# Patient Record
Sex: MALE | URBAN - METROPOLITAN AREA
[De-identification: names, ages, dates, MRNs, and addresses within clinical notes are randomized per-mention and may not be internally consistent; named-entity substitution may affect disease eponyms.]

---

## 2021-11-29 ENCOUNTER — TRANSFERRED RECORDS (OUTPATIENT)
Dept: HEALTH INFORMATION MANAGEMENT | Facility: CLINIC | Age: 66
End: 2021-11-29

## 2021-11-29 ENCOUNTER — OFFICE VISIT (OUTPATIENT)
Dept: FAMILY MEDICINE | Facility: CLINIC | Age: 66
End: 2021-11-29
Payer: COMMERCIAL

## 2021-11-29 VITALS
DIASTOLIC BLOOD PRESSURE: 78 MMHG | OXYGEN SATURATION: 96 % | HEART RATE: 83 BPM | TEMPERATURE: 97.3 F | SYSTOLIC BLOOD PRESSURE: 135 MMHG

## 2021-11-29 DIAGNOSIS — S09.90XA CLOSED HEAD INJURY, INITIAL ENCOUNTER: Primary | ICD-10-CM

## 2021-11-29 PROCEDURE — 99204 OFFICE O/P NEW MOD 45 MIN: CPT | Performed by: FAMILY MEDICINE

## 2021-11-29 RX ORDER — SITAGLIPTIN 25 MG/1
25 TABLET, FILM COATED ORAL DAILY
COMMUNITY
Start: 2021-11-26

## 2021-11-29 RX ORDER — ATORVASTATIN CALCIUM 20 MG/1
1 TABLET, FILM COATED ORAL DAILY
COMMUNITY
Start: 2020-12-23 | End: 2021-12-23

## 2021-11-29 RX ORDER — HYDROCHLOROTHIAZIDE 25 MG/1
25 TABLET ORAL
COMMUNITY

## 2021-11-29 RX ORDER — EXTENDED PHENYTOIN SODIUM 30 MG/1
CAPSULE ORAL
COMMUNITY
Start: 2020-12-23 | End: 2021-11-29

## 2021-11-29 RX ORDER — BLOOD SUGAR DIAGNOSTIC
STRIP MISCELLANEOUS
COMMUNITY
Start: 2021-06-02

## 2021-11-29 RX ORDER — LANCETS
EACH MISCELLANEOUS
COMMUNITY
Start: 2021-08-31

## 2021-11-29 RX ORDER — BLOOD-GLUCOSE METER
EACH MISCELLANEOUS SEE ADMIN INSTRUCTIONS
COMMUNITY
Start: 2021-11-02

## 2021-11-29 RX ORDER — PREDNISONE 20 MG/1
TABLET ORAL
COMMUNITY
Start: 2021-11-23

## 2021-11-29 RX ORDER — GLIPIZIDE 2.5 MG/1
1 TABLET, EXTENDED RELEASE ORAL DAILY
COMMUNITY
Start: 2020-12-28

## 2021-11-29 RX ORDER — AMLODIPINE BESYLATE 5 MG/1
5 TABLET ORAL
COMMUNITY
Start: 2020-12-23

## 2021-11-29 RX ORDER — CYCLOBENZAPRINE HCL 5 MG
TABLET ORAL
COMMUNITY
Start: 2021-05-07 | End: 2021-11-29

## 2021-11-29 RX ORDER — METHYLPREDNISOLONE 4 MG
TABLET, DOSE PACK ORAL
COMMUNITY
Start: 2021-05-21 | End: 2021-11-29

## 2021-11-29 NOTE — LETTER
REPORT OF WORK ABILITY    [address]  [address]  [phone]    PATIENT DATA    Employee Name: Emmy Ramírez      : 1955     #: [id number]    Work related injury: Yes  Employer at time of injury: [employer]  Employer contact & phone:    Employed elsewhere? No  Workers' Compensation Carrier/Managed Care Plan:       Today's date: 2021  Date of injury: 2021   Date of first visit: 2021     PROVIDER EVALUATION: Please fill in as needed.  Please give copy to employee for employer.    1. Diagnosis: closed head injury    2. Treatment: none.  3. Medication: tylenol  NOTE: When ordering a medication, [place] Rules require Work Comp or WC on prescriptions.    4.  Return to work date: 2021      ** WITH RESTRICTIONS? No restrictions    RESTRICTIONS: Unlimited unless listed.  Restrictions apply to home and leisure also.  If work restrictions is not available, the employee is totally disabled.    Maximum Medical Improvement (Date):    Any Permanent Partial Disability? 0%    Provider comments:     Medical Examiner: Guillermo Neri MD           License or registration: [id number]    Next appointment: As needed    CC: Employer, Managed Care Plan/Payor, Patient
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
## 2022-07-26 ENCOUNTER — HOSPITAL ENCOUNTER (OUTPATIENT)
Dept: GENERAL RADIOLOGY | Facility: HOSPITAL | Age: 67
Discharge: HOME | End: 2022-07-26
Attending: PHYSICIAN ASSISTANT | Admitting: PHYSICIAN ASSISTANT
Payer: COMMERCIAL

## 2022-07-26 ENCOUNTER — OFFICE VISIT (OUTPATIENT)
Dept: FAMILY MEDICINE | Facility: CLINIC | Age: 67
End: 2022-07-26
Payer: OTHER MISCELLANEOUS

## 2022-07-26 VITALS
HEART RATE: 73 BPM | OXYGEN SATURATION: 99 % | WEIGHT: 163 LBS | BODY MASS INDEX: 27.98 KG/M2 | SYSTOLIC BLOOD PRESSURE: 114 MMHG | TEMPERATURE: 97.1 F | DIASTOLIC BLOOD PRESSURE: 73 MMHG | RESPIRATION RATE: 18 BRPM
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
## 2022-07-26 PROCEDURE — 72220 X-RAY EXAM SACRUM TAILBONE: CPT | Mod: FY

## 2022-07-26 PROCEDURE — 99214 OFFICE O/P EST MOD 30 MIN: CPT | Performed by: PHYSICIAN ASSISTANT

## 2022-07-26 RX ORDER — SILDENAFIL CITRATE 20 MG/1
TABLET ORAL
COMMUNITY
Start: 2022-01-12

## 2022-07-26 ASSESSMENT — ENCOUNTER SYMPTOMS: COLOR CHANGE: 0

## 2022-07-26 NOTE — PATIENT INSTRUCTIONS
I will call you with you xray results.   Take Tylenol 1000 mg (depending on the pill strength) every 6-8 hours or ibuprofen 600mg every 6 hours by mouth.  Do not exceed 3000 mg of acetaminophen or 2400mg of ibuprofen from any source in a 24 hour period.  Taking Tylenol and ibuprofen together may be helpful in reducing pain.  Ice the head and shoulder to help with pain relief.   Seek emergency medical attention if you develop vision changes, vomiting, or severe head pain.

## 2022-07-26 NOTE — PROGRESS NOTES
Patient presents with:  Work Comp: Work Comp Injury 7/25/22 - Slipped on grease hit back of head, rt shoulder and tailbone       Clinical Decision Making: Patient slipped on grease and hit the back of his head yesterday.  No neurologic deficits and no severe head pain.  There was no loss of consciousness with head injury.  No neck stiffness or pain.  Patient also experiencing right shoulder discomfort, but has full range of motion, no swelling, no deformity.  Patient also experiencing tailbone pain.  X-ray is negative for any signs of fracture.  Patient has no signs or symptoms of cauda equina syndrome.  Recommend monitoring symptoms, pain relievers, icing, and rest.      ICD-10-CM    1. Fall, initial encounter  W19.XXXA XR Sacrum and Coccyx 2 Views       Patient Instructions   1. I will call you with you xray results.   2. Take Tylenol 1000 mg (depending on the pill strength) every 6-8 hours or ibuprofen 600mg every 6 hours by mouth.  Do not exceed 3000 mg of acetaminophen or 2400mg of ibuprofen from any source in a 24 hour period.  Taking Tylenol and ibuprofen together may be helpful in reducing pain.  3. Ice the head and shoulder to help with pain relief.   4. Seek emergency medical attention if you develop vision changes, vomiting, or severe head pain.     HPI:  Emmy Ramírez is a 66 year old male who presents today complaining of injury that occurred while at work yesterday. Patient slipped on grease and his the back of his head, right shoulder, and tailbone. No LOC. Patient is not on any blood thinners.     History obtained from the patient.    Problem List:  There are no relevant problems documented for this patient.      No past medical history on file.    Social History     Tobacco Use     Smoking status: Never Smoker     Smokeless tobacco: Never Used   Substance Use Topics     Alcohol use: Not on file       Review of Systems   Skin: Negative for color change.       Vitals:    07/26/22 1222   BP:  114/73   Pulse: 73   Resp: 18   Temp: 97.1  F (36.2  C)   TempSrc: Tympanic   SpO2: 99%   Weight: 73.9 kg (163 lb)       Physical Exam  Vitals and nursing note reviewed.   Constitutional:       General: He is not in acute distress.     Appearance: He is not toxic-appearing or diaphoretic.   HENT:      Head: Normocephalic and atraumatic.      Right Ear: Tympanic membrane, ear canal and external ear normal.      Left Ear: Tympanic membrane, ear canal and external ear normal.   Eyes:      Extraocular Movements: Extraocular movements intact.      Conjunctiva/sclera: Conjunctivae normal.      Pupils: Pupils are equal, round, and reactive to light.   Cardiovascular:      Rate and Rhythm: Normal rate and regular rhythm.      Heart sounds: No murmur heard.  Pulmonary:      Effort: Pulmonary effort is normal. No respiratory distress.      Breath sounds: No stridor. No wheezing, rhonchi or rales.   Neurological:      Mental Status: He is alert.   Psychiatric:         Mood and Affect: Mood normal.         Behavior: Behavior normal.         Thought Content: Thought content normal.         Judgment: Judgment normal.         Results:  Results for orders placed or performed during the hospital encounter of 07/26/22   XR Sacrum and Coccyx 2 Views     Status: None    Narrative    EXAM: XR SACRUM AND COCCYX 2 VIEWS  LOCATION: [facility]  DATE/TIME: 7/26/2022 1:47 PM    INDICATION: Fell yesterday. Pain on tailbone  COMPARISON: None.      Impression    IMPRESSION: No fracture. Moderate degenerative disc disease changes at L4-L5 and L5-S1.         At the end of the encounter, I discussed results, diagnosis, medications. Discussed red flags for immediate return to clinic/ER, as well as indications for follow up if no improvement. Patient understood and agreed to plan. Patient was stable for discharge.

## 2022-07-26 NOTE — LETTER
July 26, 2022      Emmy Ramírez  [address]  [place] [address]        To Whom It May Concern:    Emmy Ramírez  was seen on 7/26/22.  Please excuse him from work. He may return on 7/28/22.       Sincerely,        Vero Neri PA-C

## 2022-12-02 ENCOUNTER — OFFICE VISIT (OUTPATIENT)
Dept: FAMILY MEDICINE | Facility: CLINIC | Age: 67
End: 2022-12-02
Payer: COMMERCIAL

## 2022-12-02 VITALS
TEMPERATURE: 98.7 F | HEIGHT: 64 IN | DIASTOLIC BLOOD PRESSURE: 105 MMHG | WEIGHT: 163 LBS | OXYGEN SATURATION: 95 % | HEART RATE: 75 BPM | BODY MASS INDEX: 27.83 KG/M2 | RESPIRATION RATE: 16 BRPM | SYSTOLIC BLOOD PRESSURE: 187 MMHG

## 2022-12-02 DIAGNOSIS — K11.20 SIALADENITIS: Primary | ICD-10-CM

## 2022-12-02 PROCEDURE — 99213 OFFICE O/P EST LOW 20 MIN: CPT | Performed by: PHYSICIAN ASSISTANT

## 2022-12-02 RX ORDER — CEPHALEXIN 500 MG/1
500 CAPSULE ORAL 4 TIMES DAILY
Qty: 40 CAPSULE | Refills: 0 | Status: SHIPPED | OUTPATIENT
Start: 2022-12-02 | End: 2022-12-02

## 2022-12-02 RX ORDER — HYDROCODONE BITARTRATE AND ACETAMINOPHEN 5; 325 MG/1; MG/1
1 TABLET ORAL EVERY 6 HOURS PRN
Qty: 18 TABLET | Refills: 0 | Status: SHIPPED | OUTPATIENT
Start: 2022-12-02 | End: 2022-12-05

## 2022-12-02 RX ORDER — CEPHALEXIN 500 MG/1
500 CAPSULE ORAL 4 TIMES DAILY
Qty: 40 CAPSULE | Refills: 0 | Status: SHIPPED | OUTPATIENT
Start: 2022-12-02 | End: 2022-12-12

## 2022-12-02 NOTE — PROGRESS NOTES
Assessment & Plan     Sialadenitis  R sublingual and submandibular gland. Discussed possible etiologies including viral, obstructive, bacterial.  He has very poor dentition but no obvious dental abcess.  Consider obstructive and bacterial he has no other uri sx or systemic sx but sigificant pain.    Recommended hard candy/saliagogs, warm compresses, massage and abx as ordered.  norco for pain, he can not take NSAIDs.  PDMP review and no concerns.  If not improving as expected should recheck, may need ENT evaluation if not improving as expected.  - HYDROcodone-acetaminophen (NORCO) 5-325 MG tablet  Dispense: 18 tablet; Refill: 0  - cephALEXin (KEFLEX) 500 MG capsule  Dispense: 40 capsule; Refill: 0             No follow-ups on file.    Cookie Toney PA-C  [facility] BOLAROBIN Arellano is a 67 year old male who presents to clinic today for the following health issues:  Chief Complaint   Patient presents with     Pharyngitis     Sore thoart, pt have swelling, pt has been with pain for 4 days, pt shows me the pain around his teeth on the right side     HPI    4 day hx of pain and swelling under the R tongue as well as the R jawline.  No fevers.    Has not had similar in the past. UTD on MMR. No known exposures.  No nausea, vomiting.    Pain worse with eating.  Has tried tylenol without improving.  Quite bothered by pain.      Review of Systems  Constitutional, HEENT, cardiovascular, pulmonary, gi and gu systems are negative, except as otherwise noted.      There is no problem list on file for this patient.    Current Outpatient Medications   Medication     amLODIPine (NORVASC) 5 MG tablet     blood glucose (ACCU-CHEK GUIDE) test strip     blood glucose monitoring (SOFTCLIX) lancets     Blood Glucose Monitoring Suppl (ACCU-CHEK GUIDE ME) w/Device KIT     cephALEXin (KEFLEX) 500 MG capsule     cholecalciferol 25 MCG (1000 UT) TABS     glipiZIDE (GLUCOTROL XL) 2.5 MG 24 hr tablet      "hydrochlorothiazide (HYDRODIURIL) 25 MG tablet     HYDROcodone-acetaminophen (NORCO) 5-325 MG tablet     JANUVIA 25 MG tablet     metFORMIN (GLUCOPHAGE) 1000 MG tablet     MULTIPLE VITAMIN PO     sildenafil (REVATIO) 20 MG tablet     Zinc Methionate 50 MG CAPS     atorvastatin (LIPITOR) 20 MG tablet     predniSONE (DELTASONE) 20 MG tablet     No current facility-administered medications for this visit.       Objective    BP (!) 187/105 (BP Location: Right arm, Patient Position: Sitting, Cuff Size: Adult Regular)   Pulse 75   Temp 98.7  F (37.1  C) (Oral)   Resp 16   Ht 1.626 m (5' 4\")   Wt 73.9 kg (163 lb)   SpO2 95%   BMI 27.98 kg/m    Physical Exam   nad appears well, nontoxic appearing.   Lungs CTA  Heart RRR  Oral pharanx: no tonsillar hypertrophy or exudate.   Dentition sigificant dental decay diffusely. No gingival mucosa swelling , ulcerations or errosions.    No TTP of any of the teeth.    The sublingual gland  on the R is erythematous and edematous, TTP and palpable nodules on the R only. L is soft, non-tender and non-edematous.    Neck supple with tender submandibular gland.      "

## 2022-12-02 NOTE — PATIENT INSTRUCTIONS
Keflex antibiotic as ordered.    Warm compresses, gentle massage.    Suck on hard candy.    If worsening should be reassessed.

## 2022-12-02 NOTE — LETTER
[address]  [facility] [address]  Phone: [phone]  Fax: [phone]    December 2, 2022        Emmy Ramírez  [address]  [facility] [address]          To whom it may concern:    RE: Emmy Ramírez    Pt was seen due to illness and unable to attend work tomorrow 12-3-22.    Please contact me for questions or concerns.      Sincerely,        Cookie Toney PA-C

## 2025-06-21 ENCOUNTER — HOSPITAL ENCOUNTER (OUTPATIENT)
Dept: CT IMAGING | Facility: HOSPITAL | Age: 70
Discharge: HOME | End: 2025-06-21
Attending: FAMILY MEDICINE | Admitting: FAMILY MEDICINE
Payer: COMMERCIAL

## 2025-06-21 ENCOUNTER — RESULTS FOLLOW-UP (OUTPATIENT)
Dept: URGENT CARE | Facility: URGENT CARE | Age: 70
End: 2025-06-21

## 2025-06-21 ENCOUNTER — OFFICE VISIT (OUTPATIENT)
Dept: URGENT CARE | Facility: URGENT CARE | Age: 70
End: 2025-06-21

## 2025-06-21 VITALS
RESPIRATION RATE: 18 BRPM | HEART RATE: 94 BPM | DIASTOLIC BLOOD PRESSURE: 83 MMHG | OXYGEN SATURATION: 100 % | TEMPERATURE: 98.9 F | SYSTOLIC BLOOD PRESSURE: 152 MMHG
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
## 2025-06-21 DIAGNOSIS — S05.91XA RIGHT EYE INJURY, INITIAL ENCOUNTER: ICD-10-CM

## 2025-06-21 DIAGNOSIS — H57.11 PAIN OF RIGHT EYE: ICD-10-CM

## 2025-06-21 DIAGNOSIS — H57.11 PAIN OF RIGHT EYE: Primary | ICD-10-CM

## 2025-06-21 PROCEDURE — 70480 CT ORBIT/EAR/FOSSA W/O DYE: CPT

## 2025-06-21 PROCEDURE — 99214 OFFICE O/P EST MOD 30 MIN: CPT | Performed by: FAMILY MEDICINE
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
## 2025-06-21 NOTE — LETTER
2025    Emmy Ramírez   1955        To Whom it May Concern;    Please excuse Emmy Ramírez from work for a healthcare visit on 2025 until 25    Sincerely,        Anjali Farley MD